# Patient Record
Sex: FEMALE | Race: WHITE | NOT HISPANIC OR LATINO | Employment: STUDENT | URBAN - METROPOLITAN AREA
[De-identification: names, ages, dates, MRNs, and addresses within clinical notes are randomized per-mention and may not be internally consistent; named-entity substitution may affect disease eponyms.]

---

## 2023-03-13 DIAGNOSIS — F41.9 ANXIETY: ICD-10-CM

## 2023-03-14 PROBLEM — F41.9 ANXIETY: Status: ACTIVE | Noted: 2023-03-14

## 2023-03-14 RX ORDER — ESCITALOPRAM OXALATE 20 MG/1
20 TABLET ORAL DAILY
COMMUNITY
End: 2023-03-14 | Stop reason: SDUPTHER

## 2023-03-15 RX ORDER — ESCITALOPRAM OXALATE 20 MG/1
20 TABLET ORAL DAILY
Qty: 30 TABLET | Refills: 3 | Status: SHIPPED | OUTPATIENT
Start: 2023-03-15 | End: 2023-05-08 | Stop reason: SDUPTHER

## 2023-05-03 DIAGNOSIS — F41.9 ANXIETY: ICD-10-CM

## 2023-05-04 RX ORDER — ESCITALOPRAM OXALATE 20 MG/1
20 TABLET ORAL DAILY
Qty: 30 TABLET | Refills: 3 | OUTPATIENT
Start: 2023-05-04

## 2023-05-04 NOTE — TELEPHONE ENCOUNTER
Patient has been told to follow-up multiple times since her last appointment and has not scheduled. I will not be refilling this medication until she is seen. Thanks!

## 2023-05-08 ENCOUNTER — OFFICE VISIT (OUTPATIENT)
Dept: PRIMARY CARE | Facility: CLINIC | Age: 23
End: 2023-05-08
Payer: COMMERCIAL

## 2023-05-08 VITALS
RESPIRATION RATE: 16 BRPM | HEART RATE: 100 BPM | SYSTOLIC BLOOD PRESSURE: 105 MMHG | HEIGHT: 63 IN | BODY MASS INDEX: 25.52 KG/M2 | OXYGEN SATURATION: 98 % | TEMPERATURE: 98.2 F | DIASTOLIC BLOOD PRESSURE: 69 MMHG | WEIGHT: 144 LBS

## 2023-05-08 DIAGNOSIS — F33.40 RECURRENT MAJOR DEPRESSIVE DISORDER, IN REMISSION (CMS-HCC): Primary | ICD-10-CM

## 2023-05-08 DIAGNOSIS — F41.9 ANXIETY: ICD-10-CM

## 2023-05-08 PROBLEM — G43.109 MIGRAINE WITH AURA AND WITHOUT STATUS MIGRAINOSUS, NOT INTRACTABLE: Status: ACTIVE | Noted: 2023-05-08

## 2023-05-08 PROBLEM — R00.0 TACHYCARDIA: Status: ACTIVE | Noted: 2023-05-08

## 2023-05-08 PROBLEM — M54.81 BILATERAL OCCIPITAL NEURALGIA: Status: ACTIVE | Noted: 2023-05-08

## 2023-05-08 PROBLEM — F33.9 DEPRESSION, MAJOR, RECURRENT (CMS-HCC): Status: ACTIVE | Noted: 2023-05-08

## 2023-05-08 PROBLEM — N93.9 ABNORMAL UTERINE BLEEDING (AUB): Status: ACTIVE | Noted: 2023-05-08

## 2023-05-08 PROCEDURE — 99213 OFFICE O/P EST LOW 20 MIN: CPT | Performed by: STUDENT IN AN ORGANIZED HEALTH CARE EDUCATION/TRAINING PROGRAM

## 2023-05-08 PROCEDURE — 1036F TOBACCO NON-USER: CPT | Performed by: STUDENT IN AN ORGANIZED HEALTH CARE EDUCATION/TRAINING PROGRAM

## 2023-05-08 RX ORDER — ESCITALOPRAM OXALATE 20 MG/1
20 TABLET ORAL DAILY
Qty: 30 TABLET | Refills: 3 | Status: SHIPPED | OUTPATIENT
Start: 2023-05-08 | End: 2023-05-22 | Stop reason: SDUPTHER

## 2023-05-08 NOTE — PROGRESS NOTES
I reviewed with the resident the medical history and the resident’s findings on physical examination.  I discussed with the resident the patient’s diagnosis and concur with the treatment plan as documented in the resident note.   Patient has no SI or HI thoughts no harmful thoughts or self or others.  Patient has not seen a counselor yet.  Patient is going to start seeing a counselor.  She is can follow-up in 1 month.  We will continue to monitor if any SI or HI thoughts any harmful thoughts or self or others go to the ER  Agree with assessment plan  Side effects medication explained  Jared Donis, DO

## 2023-05-08 NOTE — PROGRESS NOTES
"Subjective   Patient ID: Annie Valentin is a 22 y.o. female who presents for Med Refill (Refills - Lexapro).    HPI   Follow-up for anxiety/depression after increasing Lexapro to 20mg 9/2022. Patient reports improvement in her symptoms with increased dose. She states her moods have stabilized, though she is still experiencing frequent anxiety symptoms. She denies appetite or sleep disturbances, SI/HI, AVH, and does admit to improvement in her daily anxiety. She was unable to establish with counseling due to insurance issues while at college but has a provider she plans to see this summer. Patient feels stable on current medications and does not want to change treatment at this time. No additional concerns at this time.      Review of Systems  12 point ROS completed and otherwise unremarkable unless stated above.    Objective   /69 (BP Location: Right arm, Patient Position: Sitting)   Pulse 100   Temp 36.8 °C (98.2 °F)   Resp 16   Ht 1.6 m (5' 3\")   Wt 65.3 kg (144 lb)   SpO2 98%   BMI 25.51 kg/m²     Physical Exam  Constitutional: Well developed, awake, alert, oriented x3  Head and Face: NCAT  Eyes: Normal external exam, EOMI  ENT: Normal external inspection of ears and nose. Oropharynx normal.  Cardiovascular: RRR, S1/S2, no murmurs, rubs, or gallops, radial pulses +2, no edema of extremities  Pulmonary: CTAB, no respiratory distress.  Abdomen: nondistended   MSK: gait normal  Skin: No lesions, contusions, or erythema.  Psychiatric: Judgment intact. Appropriate mood and behavior    Assessment/Plan   Problem List Items Addressed This Visit          Other    Anxiety    Relevant Medications    escitalopram (Lexapro) 20 mg tablet    Other Relevant Orders    CBC    Comprehensive metabolic panel    TSH with reflex to Free T4 if abnormal    Depression, major, recurrent (CMS/HCC) - Primary     Stable on current dose, denies SI/HI  Will continue Lexapro 20mg daily, 4 months provided  Recommend patient establish " with counseling, offered resources but patient has provider chosen  Follow-up for annual wellness in 2 months, labs ordered         Relevant Medications    escitalopram (Lexapro) 20 mg tablet        Daria Lares, DO PGY3

## 2023-05-08 NOTE — ASSESSMENT & PLAN NOTE
Stable on current dose, denies SI/HI  Will continue Lexapro 20mg daily, 4 months provided  Recommend patient establish with counseling, offered resources but patient has provider chosen  Follow-up for annual wellness in 2 months, labs ordered

## 2023-05-22 ENCOUNTER — TELEPHONE (OUTPATIENT)
Dept: PRIMARY CARE | Facility: CLINIC | Age: 23
End: 2023-05-22
Payer: COMMERCIAL

## 2023-05-22 DIAGNOSIS — F41.9 ANXIETY: ICD-10-CM

## 2023-05-22 DIAGNOSIS — F33.40 RECURRENT MAJOR DEPRESSIVE DISORDER, IN REMISSION (CMS-HCC): ICD-10-CM

## 2023-05-22 RX ORDER — ESCITALOPRAM OXALATE 20 MG/1
20 TABLET ORAL DAILY
Qty: 30 TABLET | Refills: 3 | Status: SHIPPED | OUTPATIENT
Start: 2023-05-22 | End: 2023-10-03 | Stop reason: SDUPTHER

## 2023-10-03 ENCOUNTER — TELEPHONE (OUTPATIENT)
Dept: PRIMARY CARE | Facility: CLINIC | Age: 23
End: 2023-10-03
Payer: COMMERCIAL

## 2023-10-03 DIAGNOSIS — F41.9 ANXIETY: ICD-10-CM

## 2023-10-03 DIAGNOSIS — F33.40 RECURRENT MAJOR DEPRESSIVE DISORDER, IN REMISSION (CMS-HCC): ICD-10-CM

## 2023-10-03 RX ORDER — ESCITALOPRAM OXALATE 20 MG/1
20 TABLET ORAL DAILY
Qty: 30 TABLET | Refills: 3 | Status: SHIPPED | OUTPATIENT
Start: 2023-10-03 | End: 2023-11-08

## 2023-10-28 DIAGNOSIS — F41.9 ANXIETY: ICD-10-CM

## 2023-10-28 DIAGNOSIS — F33.40 RECURRENT MAJOR DEPRESSIVE DISORDER, IN REMISSION (CMS-HCC): ICD-10-CM

## 2023-11-08 RX ORDER — ESCITALOPRAM OXALATE 20 MG/1
20 TABLET ORAL DAILY
Qty: 90 TABLET | Refills: 2 | Status: SHIPPED | OUTPATIENT
Start: 2023-11-08 | End: 2023-12-15 | Stop reason: SDUPTHER

## 2023-12-15 ENCOUNTER — TELEPHONE (OUTPATIENT)
Dept: PRIMARY CARE | Facility: CLINIC | Age: 23
End: 2023-12-15
Payer: COMMERCIAL

## 2023-12-15 DIAGNOSIS — F41.9 ANXIETY: ICD-10-CM

## 2023-12-15 DIAGNOSIS — F33.40 RECURRENT MAJOR DEPRESSIVE DISORDER, IN REMISSION (CMS-HCC): ICD-10-CM

## 2023-12-15 RX ORDER — ESCITALOPRAM OXALATE 20 MG/1
20 TABLET ORAL DAILY
Qty: 30 TABLET | Refills: 0 | Status: SHIPPED | OUTPATIENT
Start: 2023-12-15 | End: 2024-02-19 | Stop reason: SDUPTHER

## 2024-02-19 ENCOUNTER — OFFICE VISIT (OUTPATIENT)
Dept: PRIMARY CARE | Facility: CLINIC | Age: 24
End: 2024-02-19
Payer: COMMERCIAL

## 2024-02-19 VITALS
OXYGEN SATURATION: 99 % | SYSTOLIC BLOOD PRESSURE: 102 MMHG | BODY MASS INDEX: 28.84 KG/M2 | WEIGHT: 162.8 LBS | HEIGHT: 63 IN | RESPIRATION RATE: 18 BRPM | HEART RATE: 97 BPM | TEMPERATURE: 98 F | DIASTOLIC BLOOD PRESSURE: 62 MMHG

## 2024-02-19 DIAGNOSIS — Z83.3 FAMILY HISTORY OF DIABETES MELLITUS: ICD-10-CM

## 2024-02-19 DIAGNOSIS — F41.9 ANXIETY: Primary | ICD-10-CM

## 2024-02-19 DIAGNOSIS — N92.6 IRREGULAR PERIODS/MENSTRUAL CYCLES: ICD-10-CM

## 2024-02-19 DIAGNOSIS — F33.40 RECURRENT MAJOR DEPRESSIVE DISORDER, IN REMISSION (CMS-HCC): ICD-10-CM

## 2024-02-19 PROCEDURE — 99204 OFFICE O/P NEW MOD 45 MIN: CPT | Performed by: NURSE PRACTITIONER

## 2024-02-19 PROCEDURE — 1036F TOBACCO NON-USER: CPT | Performed by: NURSE PRACTITIONER

## 2024-02-19 RX ORDER — ESCITALOPRAM OXALATE 20 MG/1
20 TABLET ORAL DAILY
Qty: 30 TABLET | Refills: 0
Start: 2024-02-19 | End: 2024-02-28 | Stop reason: SDUPTHER

## 2024-02-19 NOTE — PROGRESS NOTES
Subjective   Annie Valentin is a 23 y.o. female who presents for Menstrual Problem (Patient has always had regular, 28 day cycles. Patient noticed cycles have been longer than usual. Changes were sudden and started in November 2023. Cycles have not gone back to normal. Patient tracks ovulation and ovulation has been taking place later than usual. ). Now cycle has lengthened to 33-42 days. She thought the cycle change may have been due to stress related to wedding planning ( 12/2023). She feels flow is a bit lighter than it used to be, but still lasts 4-5 days. Average amount of discomfort during menses.   Pt. Was never sexually active until 12/2023, but changes in her menses began 11/2023.   Review of Systems   Constitutional:  Negative for fatigue, fever and unexpected weight change.   Respiratory:  Negative for cough and shortness of breath.    Cardiovascular:  Negative for chest pain and palpitations.   Gastrointestinal:  Negative for abdominal pain, constipation and diarrhea.   Endocrine: Negative for polyphagia and polyuria.   Genitourinary:  Negative for frequency, pelvic pain and vaginal bleeding.     Objective   Physical Exam  Vitals reviewed.   Constitutional:       Appearance: Normal appearance.   HENT:      Head: Normocephalic.   Eyes:      Conjunctiva/sclera: Conjunctivae normal.   Cardiovascular:      Rate and Rhythm: Normal rate and regular rhythm.      Pulses: Normal pulses.   Pulmonary:      Breath sounds: Normal breath sounds.   Abdominal:      General: Bowel sounds are normal.      Palpations: Abdomen is soft.   Musculoskeletal:      Cervical back: Neck supple.      Right lower leg: No edema.      Left lower leg: No edema.   Skin:     General: Skin is warm and dry.   Neurological:      General: No focal deficit present.      Mental Status: She is alert.   Psychiatric:         Mood and Affect: Mood normal.         Thought Content: Thought content normal.     /62   Pulse 97   Temp 36.7 °C  "(98 °F)   Resp 18   Ht 1.6 m (5' 3\")   Wt 73.8 kg (162 lb 12.8 oz)   SpO2 99%   BMI 28.84 kg/m²   Assessment/Plan   Problem List Items Addressed This Visit       Anxiety - Primary    Relevant Medications    escitalopram (Lexapro) 20 mg tablet    Other Relevant Orders    Follow Up In [facility] - PCP - Health Maintenance    Depression, major, recurrent (CMS/HCC)    Relevant Medications    escitalopram (Lexapro) 20 mg tablet    Family history of diabetes mellitus    Relevant Orders    Hemoglobin A1C    Irregular periods/menstrual cycles    Current Assessment & Plan     Advised pt to have TSH, CBC, and CBC drawn as ordered by previous provider. Patient has not missed any periods, but her cycle has lengthened to 33-42 days over the last 3 months. Advised pt to follow-up with her GYN.                  "

## 2024-02-21 PROBLEM — M22.40 CHONDROMALACIA OF PATELLA: Status: RESOLVED | Noted: 2018-12-04 | Resolved: 2024-02-21

## 2024-02-21 PROBLEM — M23.329 DERANGEMENT OF POSTERIOR HORN OF MEDIAL MENISCUS: Status: RESOLVED | Noted: 2018-10-30 | Resolved: 2024-02-21

## 2024-02-21 PROBLEM — L74.510 HYPERHIDROSIS OF AXILLA: Status: ACTIVE | Noted: 2024-02-21

## 2024-02-21 PROBLEM — M25.562 CHRONIC PAIN OF LEFT KNEE: Status: ACTIVE | Noted: 2021-12-28

## 2024-02-21 PROBLEM — R51.9 HEADACHE: Status: ACTIVE | Noted: 2024-02-21

## 2024-02-21 PROBLEM — G89.29 CHRONIC PAIN OF LEFT KNEE: Status: ACTIVE | Noted: 2021-12-28

## 2024-02-21 PROBLEM — R55 NEAR SYNCOPE: Status: RESOLVED | Noted: 2024-02-21 | Resolved: 2024-02-21

## 2024-02-21 PROBLEM — N92.6 IRREGULAR PERIODS/MENSTRUAL CYCLES: Status: ACTIVE | Noted: 2024-02-21

## 2024-02-21 PROBLEM — N93.9 ABNORMAL UTERINE BLEEDING (AUB): Status: RESOLVED | Noted: 2023-05-08 | Resolved: 2024-02-21

## 2024-02-21 PROBLEM — Z83.3 FAMILY HISTORY OF DIABETES MELLITUS: Status: ACTIVE | Noted: 2024-02-21

## 2024-02-21 ASSESSMENT — ENCOUNTER SYMPTOMS
FATIGUE: 0
DIARRHEA: 0
PALPITATIONS: 0
COUGH: 0
SHORTNESS OF BREATH: 0
ABDOMINAL PAIN: 0
CONSTIPATION: 0
POLYPHAGIA: 0
UNEXPECTED WEIGHT CHANGE: 0
FEVER: 0
FREQUENCY: 0

## 2024-02-21 NOTE — ASSESSMENT & PLAN NOTE
Advised pt to have TSH, CBC, and CBC drawn as ordered by previous provider. Patient has not missed any periods, but her cycle has lengthened to 33-42 days over the last 3 months. Advised pt to follow-up with her GYN.

## 2024-02-28 DIAGNOSIS — F33.40 RECURRENT MAJOR DEPRESSIVE DISORDER, IN REMISSION (CMS-HCC): ICD-10-CM

## 2024-02-28 DIAGNOSIS — F41.9 ANXIETY: ICD-10-CM

## 2024-02-29 RX ORDER — ESCITALOPRAM OXALATE 20 MG/1
20 TABLET ORAL DAILY
Qty: 15 TABLET | Refills: 0 | Status: SHIPPED | OUTPATIENT
Start: 2024-02-29 | End: 2024-03-15

## 2024-07-11 DIAGNOSIS — F33.40 RECURRENT MAJOR DEPRESSIVE DISORDER, IN REMISSION (CMS-HCC): ICD-10-CM

## 2024-07-11 DIAGNOSIS — F41.9 ANXIETY: ICD-10-CM

## 2024-07-11 RX ORDER — ESCITALOPRAM OXALATE 10 MG/1
10 TABLET ORAL DAILY
Qty: 30 TABLET | Refills: 11 | Status: SHIPPED | OUTPATIENT
Start: 2024-07-11 | End: 2025-07-11

## 2024-12-03 ENCOUNTER — APPOINTMENT (OUTPATIENT)
Dept: PRIMARY CARE | Facility: CLINIC | Age: 24
End: 2024-12-03
Payer: COMMERCIAL

## 2025-07-24 DIAGNOSIS — F33.40 RECURRENT MAJOR DEPRESSIVE DISORDER, IN REMISSION: ICD-10-CM

## 2025-07-24 DIAGNOSIS — F41.9 ANXIETY: ICD-10-CM

## 2025-07-26 RX ORDER — ESCITALOPRAM OXALATE 10 MG/1
10 TABLET ORAL DAILY
Qty: 30 TABLET | Refills: 1 | Status: SHIPPED | OUTPATIENT
Start: 2025-07-26

## 2025-08-20 DIAGNOSIS — F41.9 ANXIETY: ICD-10-CM

## 2025-08-20 DIAGNOSIS — F33.40 RECURRENT MAJOR DEPRESSIVE DISORDER, IN REMISSION: ICD-10-CM

## 2025-08-20 RX ORDER — ESCITALOPRAM OXALATE 10 MG/1
10 TABLET ORAL DAILY
Qty: 90 TABLET | Refills: 0 | Status: SHIPPED | OUTPATIENT
Start: 2025-08-20